# Patient Record
Sex: FEMALE | Race: WHITE | NOT HISPANIC OR LATINO
[De-identification: names, ages, dates, MRNs, and addresses within clinical notes are randomized per-mention and may not be internally consistent; named-entity substitution may affect disease eponyms.]

---

## 2021-07-12 PROBLEM — Z00.00 ENCOUNTER FOR PREVENTIVE HEALTH EXAMINATION: Status: ACTIVE | Noted: 2021-07-12

## 2021-07-13 ENCOUNTER — TRANSCRIPTION ENCOUNTER (OUTPATIENT)
Age: 31
End: 2021-07-13

## 2021-07-13 ENCOUNTER — APPOINTMENT (OUTPATIENT)
Dept: OBGYN | Facility: CLINIC | Age: 31
End: 2021-07-13
Payer: COMMERCIAL

## 2021-07-13 ENCOUNTER — NON-APPOINTMENT (OUTPATIENT)
Age: 31
End: 2021-07-13

## 2021-07-13 VITALS
DIASTOLIC BLOOD PRESSURE: 78 MMHG | HEIGHT: 63 IN | SYSTOLIC BLOOD PRESSURE: 117 MMHG | WEIGHT: 126 LBS | BODY MASS INDEX: 22.32 KG/M2

## 2021-07-13 DIAGNOSIS — O03.4 INCOMPLETE SPONTANEOUS ABORTION W/OUT COMPLICATION: ICD-10-CM

## 2021-07-13 DIAGNOSIS — Z80.3 FAMILY HISTORY OF MALIGNANT NEOPLASM OF BREAST: ICD-10-CM

## 2021-07-13 DIAGNOSIS — Z32.01 ENCOUNTER FOR PREGNANCY TEST, RESULT POSITIVE: ICD-10-CM

## 2021-07-13 PROCEDURE — 99072 ADDL SUPL MATRL&STAF TM PHE: CPT

## 2021-07-13 PROCEDURE — 99204 OFFICE O/P NEW MOD 45 MIN: CPT

## 2021-07-13 RX ORDER — MISOPROSTOL 200 UG/1
200 TABLET ORAL
Qty: 3 | Refills: 0 | Status: ACTIVE | COMMUNITY
Start: 2021-07-13 | End: 1900-01-01

## 2021-07-13 NOTE — PLAN
[FreeTextEntry1] : 29 y/o , LMP 2021, regular cycles, comes in for f/u for spontaneous .  She is here with  BOAZ.  VB started  and continues lightly through today.  Went to ER in [place]  and had u/s that showed GS but no yolk sac and labs (scanned in - A pos blood type, bHCG 339).  Should have been 7-8 weeks by LMP.  Was told in ER, likely miscarriage and was d/c'd home with bleeding precautions.\par \par Today reports mild cramping and light bleeding.  TVUS done and thickened stripe with clots vs products.  D/w pt and  that we could wait another 1-2 weeks for uterus to be clear on own but if she had remaining products that she could also choose to take cytotec bucchally.  Since she had recent Hgb of 14  with only mild amount of bleeding since, repeat CBC not done today.  Repeat bHCG drawn today.  \par \par Recommended following bHCG to negative before trying for pregnancy again.  Pt went off OCPs 2 months ago (had been on ~ 10 years) and has no family h/o recurrent miscarriage.  She has very regular cycles.  \par \par Pt desired cytotec to be sent to pharmacy (cytotec 600 mcg sent)\par Will RTC 1-2 weeks for f/u appt and repeat u/s.\par Bleeding precautions given.

## 2021-07-13 NOTE — PHYSICAL EXAM
[Appropriately responsive] : appropriately responsive [Alert] : alert [No Acute Distress] : no acute distress [No Lymphadenopathy] : no lymphadenopathy [Soft] : soft [Non-tender] : non-tender [Non-distended] : non-distended [Oriented x3] : oriented x3 [FreeTextEntry5] : No increased work of breathing [Labia Majora] : normal [Labia Minora] : normal [Normal] : normal

## 2021-07-13 NOTE — HISTORY OF PRESENT ILLNESS
[Patient reported PAP Smear was normal] : Patient reported PAP Smear was normal [N] : Patient does not use contraception [Monogamous (Male Partner)] : is monogamous with a male partner [Y] : Positive pregnancy history [Regular Cycle Intervals] : periods have been regular [PapSmeardate] : 2020 [PGxTotal] : 1 [PGHxABSpont] : 1 [FreeTextEntry1] : 05/19/21

## 2021-07-20 ENCOUNTER — APPOINTMENT (OUTPATIENT)
Dept: OBGYN | Facility: CLINIC | Age: 31
End: 2021-07-20
Payer: COMMERCIAL

## 2021-07-20 VITALS
BODY MASS INDEX: 21.97 KG/M2 | DIASTOLIC BLOOD PRESSURE: 68 MMHG | WEIGHT: 124 LBS | SYSTOLIC BLOOD PRESSURE: 106 MMHG | HEIGHT: 63 IN

## 2021-07-20 DIAGNOSIS — O03.9 COMPLETE OR UNSPECIFIED SPONTANEOUS ABORTION W/OUT COMPLICATION: ICD-10-CM

## 2021-07-20 DIAGNOSIS — N91.2 AMENORRHEA, UNSPECIFIED: ICD-10-CM

## 2021-07-20 LAB — HCG SERPL-MCNC: 227 MIU/ML

## 2021-07-20 PROCEDURE — 36415 COLL VENOUS BLD VENIPUNCTURE: CPT

## 2021-07-20 PROCEDURE — 99072 ADDL SUPL MATRL&STAF TM PHE: CPT

## 2021-07-20 PROCEDURE — 99213 OFFICE O/P EST LOW 20 MIN: CPT

## 2021-07-27 PROBLEM — N91.2 AMENORRHEA: Status: ACTIVE | Noted: 2021-07-27

## 2021-07-27 LAB — HCG SERPL-MCNC: 4 MIU/ML

## 2021-07-27 NOTE — PHYSICAL EXAM
[Appropriately responsive] : appropriately responsive [Alert] : alert [No Acute Distress] : no acute distress [Soft] : soft [Non-tender] : non-tender [Non-distended] : non-distended [No Mass] : no mass [Oriented x3] : oriented x3 [Normal] : normal external genitalia [FreeTextEntry2] : NAD
